# Patient Record
Sex: MALE | Race: WHITE | NOT HISPANIC OR LATINO | URBAN - NONMETROPOLITAN AREA
[De-identification: names, ages, dates, MRNs, and addresses within clinical notes are randomized per-mention and may not be internally consistent; named-entity substitution may affect disease eponyms.]

---

## 2019-04-26 PROBLEM — K57.30 DVRTCLOS OF LG INT W/O PERFORATION OR ABSCESS W/O BLEEDING: Status: ACTIVE | Noted: 2019-04-26

## 2022-06-02 ENCOUNTER — OFFICE (OUTPATIENT)
Dept: URBAN - NONMETROPOLITAN AREA CLINIC 5 | Facility: CLINIC | Age: 67
End: 2022-06-02

## 2022-06-02 VITALS
HEIGHT: 70 IN | RESPIRATION RATE: 18 BRPM | HEART RATE: 89 BPM | WEIGHT: 240 LBS | SYSTOLIC BLOOD PRESSURE: 131 MMHG | DIASTOLIC BLOOD PRESSURE: 96 MMHG

## 2022-06-02 DIAGNOSIS — R19.4 CHANGE IN BOWEL HABIT: ICD-10-CM

## 2022-06-02 DIAGNOSIS — K57.90 DIVERTICULOSIS OF INTESTINE, PART UNSPECIFIED, WITHOUT PERFO: ICD-10-CM

## 2022-06-02 DIAGNOSIS — Z78.9 OTHER SPECIFIED HEALTH STATUS: ICD-10-CM

## 2022-06-02 DIAGNOSIS — R14.2 ERUCTATION: ICD-10-CM

## 2022-06-02 PROCEDURE — 99214 OFFICE O/P EST MOD 30 MIN: CPT | Performed by: INTERNAL MEDICINE

## 2022-09-06 ENCOUNTER — OFFICE (OUTPATIENT)
Dept: URBAN - NONMETROPOLITAN AREA CLINIC 5 | Facility: CLINIC | Age: 67
End: 2022-09-06

## 2022-09-06 VITALS
HEART RATE: 60 BPM | DIASTOLIC BLOOD PRESSURE: 65 MMHG | SYSTOLIC BLOOD PRESSURE: 123 MMHG | HEIGHT: 70 IN | RESPIRATION RATE: 18 BRPM | WEIGHT: 226 LBS

## 2022-09-06 DIAGNOSIS — R19.4 CHANGE IN BOWEL HABIT: ICD-10-CM

## 2022-09-06 DIAGNOSIS — R11.0 NAUSEA: ICD-10-CM

## 2022-09-06 DIAGNOSIS — R19.7 DIARRHEA, UNSPECIFIED: ICD-10-CM

## 2022-09-06 DIAGNOSIS — R14.2 ERUCTATION: ICD-10-CM

## 2022-09-06 DIAGNOSIS — R10.9 UNSPECIFIED ABDOMINAL PAIN: ICD-10-CM

## 2022-09-06 DIAGNOSIS — K59.00 CONSTIPATION, UNSPECIFIED: ICD-10-CM

## 2022-09-06 PROCEDURE — 99214 OFFICE O/P EST MOD 30 MIN: CPT | Performed by: INTERNAL MEDICINE

## 2022-09-06 NOTE — SERVICEHPINOTES
Lauri Foreman   is a   67   year old  male   who is here today for routine follow up.    He was last here in June.  He was first seen by me  several years ago at the [facility], sometime around 2014, when he had a severe bout with diverticulitis and a transient low level stricture.    He finally got over that and has done reasonably well since, other than he struggles with chronic slow transit constipation.  When he was last here he was doing reasonably well on Linzess though he was pairing that with Metamucil capsules at night.  He has only been on the 145 mcg doseAt last visit he was having a bit too much bowel activity from Linzess 145 mcg and so his bowels were fluctuating from loose to constipation based on the way he was using and withholding his medication.  He had done better when he had been using Metamucil at night and so one of the options was for him to put that back in place.  Also gave him samples of the Linzess at 72 mcg dose and also of true Wood and he was to report back how things were going.    he never really tried true Wood since adding in fiber seemed to take care of everything in conjunction with Linzess 72 mcg dosing.  He would wake up at 6:00 a.m., half toast and a small amount of coffee, and then 30 minutes later he would go two or 3 times within a 1/2 hour and then would be done for the day, which was much better than the other random times he would go throughout the morning.  He was doing really well up until the mid part of August when he had another ablation for which she had deep sedation.  As in the past it has led to fairly significant constipation that usually takes many weeks before things settle in back to normal again.  He had three episodes where he nearly passed out trying to strain and evacuate and his stools were a good bit darker when he finally was able to go.  Just in the last week he seems to have gotten back on a routine and is added back Colace to help with all of that.   she has had no bright red blood.  Remains very bloated and stuffed feeling.  Has had some nausea at times in a bit more belching and reflux but no vomiting.  He has been steadily working on dietary modifications and better control of his diabetes and is now down 15 lb from when he was here in June.  He is feeling better and is not needing as much insulin.

## 2022-09-06 NOTE — SERVICENOTES
-  he still has some of the Linzess 145 mcg capsules left from before and he is to start back on that for the next week or two until this level of constipation seems to have broken.  After that he can either go back to the 72 mcg dose in conjunction with his fiber or may wish to try the true Wood samples that he still has.  I think this will work itself out with time
-  continue Prilosec though increase it to twice daily for the next week to help get things under control and then back to daily once again
-  will see him back in 4-6 months for recheck

## 2023-03-20 ENCOUNTER — OFFICE (OUTPATIENT)
Dept: URBAN - NONMETROPOLITAN AREA CLINIC 5 | Facility: CLINIC | Age: 68
End: 2023-03-20

## 2023-03-20 VITALS
SYSTOLIC BLOOD PRESSURE: 144 MMHG | RESPIRATION RATE: 18 BRPM | HEIGHT: 70 IN | WEIGHT: 220 LBS | DIASTOLIC BLOOD PRESSURE: 66 MMHG | HEART RATE: 65 BPM

## 2023-03-20 DIAGNOSIS — R19.4 CHANGE IN BOWEL HABIT: ICD-10-CM

## 2023-03-20 DIAGNOSIS — K21.9 GASTRO-ESOPHAGEAL REFLUX DISEASE WITHOUT ESOPHAGITIS: ICD-10-CM

## 2023-03-20 DIAGNOSIS — R14.2 ERUCTATION: ICD-10-CM

## 2023-03-20 DIAGNOSIS — R11.0 NAUSEA: ICD-10-CM

## 2023-03-20 PROCEDURE — 99214 OFFICE O/P EST MOD 30 MIN: CPT | Performed by: INTERNAL MEDICINE

## 2023-07-19 ENCOUNTER — OFFICE (OUTPATIENT)
Dept: URBAN - NONMETROPOLITAN AREA CLINIC 5 | Facility: CLINIC | Age: 68
End: 2023-07-19

## 2023-07-19 VITALS
DIASTOLIC BLOOD PRESSURE: 68 MMHG | HEART RATE: 70 BPM | WEIGHT: 217 LBS | SYSTOLIC BLOOD PRESSURE: 128 MMHG | HEIGHT: 70 IN | RESPIRATION RATE: 18 BRPM

## 2023-07-19 DIAGNOSIS — K57.30 DIVERTICULOSIS OF LARGE INTESTINE WITHOUT PERFORATION OR ABS: ICD-10-CM

## 2023-07-19 DIAGNOSIS — K21.9 GASTRO-ESOPHAGEAL REFLUX DISEASE WITHOUT ESOPHAGITIS: ICD-10-CM

## 2023-07-19 DIAGNOSIS — R19.4 CHANGE IN BOWEL HABIT: ICD-10-CM

## 2023-07-19 DIAGNOSIS — R11.0 NAUSEA: ICD-10-CM

## 2023-07-19 PROCEDURE — 99214 OFFICE O/P EST MOD 30 MIN: CPT | Performed by: INTERNAL MEDICINE

## 2023-08-28 ENCOUNTER — AMBULATORY SURGICAL CENTER (OUTPATIENT)
Dept: URBAN - NONMETROPOLITAN AREA SURGERY 4 | Facility: SURGERY | Age: 68
End: 2023-08-28

## 2023-08-28 ENCOUNTER — OFFICE (OUTPATIENT)
Dept: URBAN - METROPOLITAN AREA PATHOLOGY 16 | Facility: PATHOLOGY | Age: 68
End: 2023-08-28

## 2023-08-28 VITALS
HEART RATE: 62 BPM | SYSTOLIC BLOOD PRESSURE: 176 MMHG | TEMPERATURE: 96.7 F | WEIGHT: 212 LBS | DIASTOLIC BLOOD PRESSURE: 50 MMHG | HEART RATE: 64 BPM | HEIGHT: 70 IN | DIASTOLIC BLOOD PRESSURE: 44 MMHG | HEART RATE: 58 BPM | HEART RATE: 70 BPM | SYSTOLIC BLOOD PRESSURE: 133 MMHG | DIASTOLIC BLOOD PRESSURE: 41 MMHG | WEIGHT: 212 LBS | HEART RATE: 63 BPM | OXYGEN SATURATION: 98 % | OXYGEN SATURATION: 98 % | OXYGEN SATURATION: 96 % | HEART RATE: 61 BPM | RESPIRATION RATE: 14 BRPM | RESPIRATION RATE: 17 BRPM | SYSTOLIC BLOOD PRESSURE: 132 MMHG | SYSTOLIC BLOOD PRESSURE: 121 MMHG | SYSTOLIC BLOOD PRESSURE: 176 MMHG | DIASTOLIC BLOOD PRESSURE: 50 MMHG | DIASTOLIC BLOOD PRESSURE: 46 MMHG | HEART RATE: 63 BPM | SYSTOLIC BLOOD PRESSURE: 181 MMHG | HEART RATE: 62 BPM | SYSTOLIC BLOOD PRESSURE: 176 MMHG | RESPIRATION RATE: 16 BRPM | DIASTOLIC BLOOD PRESSURE: 40 MMHG | DIASTOLIC BLOOD PRESSURE: 65 MMHG | HEART RATE: 65 BPM | TEMPERATURE: 98 F | SYSTOLIC BLOOD PRESSURE: 132 MMHG | SYSTOLIC BLOOD PRESSURE: 122 MMHG | TEMPERATURE: 98 F | DIASTOLIC BLOOD PRESSURE: 42 MMHG | TEMPERATURE: 96.7 F | TEMPERATURE: 98.6 F | DIASTOLIC BLOOD PRESSURE: 50 MMHG | WEIGHT: 212 LBS | DIASTOLIC BLOOD PRESSURE: 42 MMHG | RESPIRATION RATE: 13 BRPM | HEART RATE: 62 BPM | SYSTOLIC BLOOD PRESSURE: 138 MMHG | SYSTOLIC BLOOD PRESSURE: 175 MMHG | HEART RATE: 58 BPM | OXYGEN SATURATION: 95 % | HEART RATE: 70 BPM | DIASTOLIC BLOOD PRESSURE: 65 MMHG | SYSTOLIC BLOOD PRESSURE: 171 MMHG | DIASTOLIC BLOOD PRESSURE: 68 MMHG | HEART RATE: 61 BPM | HEART RATE: 58 BPM | RESPIRATION RATE: 13 BRPM | DIASTOLIC BLOOD PRESSURE: 41 MMHG | SYSTOLIC BLOOD PRESSURE: 132 MMHG | RESPIRATION RATE: 14 BRPM | SYSTOLIC BLOOD PRESSURE: 133 MMHG | RESPIRATION RATE: 15 BRPM | RESPIRATION RATE: 15 BRPM | DIASTOLIC BLOOD PRESSURE: 44 MMHG | OXYGEN SATURATION: 96 % | RESPIRATION RATE: 17 BRPM | DIASTOLIC BLOOD PRESSURE: 40 MMHG | SYSTOLIC BLOOD PRESSURE: 124 MMHG | SYSTOLIC BLOOD PRESSURE: 127 MMHG | OXYGEN SATURATION: 99 % | SYSTOLIC BLOOD PRESSURE: 124 MMHG | DIASTOLIC BLOOD PRESSURE: 40 MMHG | DIASTOLIC BLOOD PRESSURE: 41 MMHG | SYSTOLIC BLOOD PRESSURE: 138 MMHG | DIASTOLIC BLOOD PRESSURE: 76 MMHG | SYSTOLIC BLOOD PRESSURE: 181 MMHG | DIASTOLIC BLOOD PRESSURE: 75 MMHG | OXYGEN SATURATION: 98 % | RESPIRATION RATE: 13 BRPM | OXYGEN SATURATION: 94 % | HEART RATE: 64 BPM | OXYGEN SATURATION: 95 % | HEART RATE: 65 BPM | TEMPERATURE: 98.6 F | DIASTOLIC BLOOD PRESSURE: 45 MMHG | OXYGEN SATURATION: 96 % | OXYGEN SATURATION: 94 % | OXYGEN SATURATION: 99 % | OXYGEN SATURATION: 95 % | HEART RATE: 70 BPM | SYSTOLIC BLOOD PRESSURE: 121 MMHG | RESPIRATION RATE: 16 BRPM | DIASTOLIC BLOOD PRESSURE: 65 MMHG | DIASTOLIC BLOOD PRESSURE: 76 MMHG | DIASTOLIC BLOOD PRESSURE: 46 MMHG | SYSTOLIC BLOOD PRESSURE: 175 MMHG | SYSTOLIC BLOOD PRESSURE: 124 MMHG | SYSTOLIC BLOOD PRESSURE: 122 MMHG | DIASTOLIC BLOOD PRESSURE: 46 MMHG | SYSTOLIC BLOOD PRESSURE: 127 MMHG | HEIGHT: 70 IN | RESPIRATION RATE: 16 BRPM | DIASTOLIC BLOOD PRESSURE: 76 MMHG | DIASTOLIC BLOOD PRESSURE: 42 MMHG | SYSTOLIC BLOOD PRESSURE: 171 MMHG | SYSTOLIC BLOOD PRESSURE: 133 MMHG | OXYGEN SATURATION: 94 % | SYSTOLIC BLOOD PRESSURE: 122 MMHG | SYSTOLIC BLOOD PRESSURE: 138 MMHG | HEART RATE: 63 BPM | HEART RATE: 61 BPM | TEMPERATURE: 98.6 F | HEART RATE: 64 BPM | OXYGEN SATURATION: 99 % | SYSTOLIC BLOOD PRESSURE: 181 MMHG | HEIGHT: 70 IN | DIASTOLIC BLOOD PRESSURE: 45 MMHG | TEMPERATURE: 96.7 F | SYSTOLIC BLOOD PRESSURE: 175 MMHG | HEART RATE: 65 BPM | SYSTOLIC BLOOD PRESSURE: 121 MMHG | DIASTOLIC BLOOD PRESSURE: 45 MMHG | DIASTOLIC BLOOD PRESSURE: 75 MMHG | TEMPERATURE: 98 F | DIASTOLIC BLOOD PRESSURE: 68 MMHG | RESPIRATION RATE: 14 BRPM | HEART RATE: 60 BPM | DIASTOLIC BLOOD PRESSURE: 44 MMHG | DIASTOLIC BLOOD PRESSURE: 68 MMHG | RESPIRATION RATE: 15 BRPM | SYSTOLIC BLOOD PRESSURE: 171 MMHG | RESPIRATION RATE: 17 BRPM | HEART RATE: 60 BPM | HEART RATE: 60 BPM | DIASTOLIC BLOOD PRESSURE: 75 MMHG | SYSTOLIC BLOOD PRESSURE: 127 MMHG

## 2023-08-28 DIAGNOSIS — D12.4 BENIGN NEOPLASM OF DESCENDING COLON: ICD-10-CM

## 2023-08-28 DIAGNOSIS — D12.0 BENIGN NEOPLASM OF CECUM: ICD-10-CM

## 2023-08-28 DIAGNOSIS — R19.5 OTHER FECAL ABNORMALITIES: ICD-10-CM

## 2023-08-28 DIAGNOSIS — R19.4 CHANGE IN BOWEL HABIT: ICD-10-CM

## 2023-08-28 DIAGNOSIS — D50.9 IRON DEFICIENCY ANEMIA, UNSPECIFIED: ICD-10-CM

## 2023-08-28 DIAGNOSIS — K63.5 POLYP OF COLON: ICD-10-CM

## 2023-08-28 DIAGNOSIS — K57.30 DIVERTICULOSIS OF LARGE INTESTINE WITHOUT PERFORATION OR ABS: ICD-10-CM

## 2023-08-28 PROCEDURE — 45384 COLONOSCOPY W/LESION REMOVAL: CPT | Mod: 59 | Performed by: INTERNAL MEDICINE

## 2023-08-28 PROCEDURE — 88305 TISSUE EXAM BY PATHOLOGIST: CPT | Performed by: STUDENT IN AN ORGANIZED HEALTH CARE EDUCATION/TRAINING PROGRAM

## 2023-08-28 PROCEDURE — 00811 ANES LWR INTST NDSC NOS: CPT | Mod: QZ | Performed by: NURSE ANESTHETIST, CERTIFIED REGISTERED

## 2023-08-28 PROCEDURE — 45385 COLONOSCOPY W/LESION REMOVAL: CPT | Performed by: INTERNAL MEDICINE

## 2023-08-31 LAB
GASTRO ONE PATHOLOGY: PDF REPORT: (no result)